# Patient Record
Sex: FEMALE | Race: WHITE | NOT HISPANIC OR LATINO | Employment: PART TIME | URBAN - METROPOLITAN AREA
[De-identification: names, ages, dates, MRNs, and addresses within clinical notes are randomized per-mention and may not be internally consistent; named-entity substitution may affect disease eponyms.]

---

## 2024-11-05 ENCOUNTER — APPOINTMENT (OUTPATIENT)
Dept: DERMATOLOGY | Facility: CLINIC | Age: 69
End: 2024-11-05
Payer: MEDICARE

## 2024-11-05 DIAGNOSIS — Z12.83 SCREENING EXAM FOR SKIN CANCER: ICD-10-CM

## 2024-11-05 DIAGNOSIS — D22.9 NEVUS: Primary | ICD-10-CM

## 2024-11-05 DIAGNOSIS — L92.0 GRANULOMA ANNULARE: ICD-10-CM

## 2024-11-05 DIAGNOSIS — L81.4 LENTIGO: ICD-10-CM

## 2024-11-05 PROCEDURE — 1159F MED LIST DOCD IN RCRD: CPT | Performed by: NURSE PRACTITIONER

## 2024-11-05 PROCEDURE — 99213 OFFICE O/P EST LOW 20 MIN: CPT | Performed by: NURSE PRACTITIONER

## 2024-11-05 NOTE — PROGRESS NOTES
Subjective     Carla Mcgovern is a 68 y.o. female who presents for the following: Skin Check.   Established patient in for full body skin exam, patient has concerns of spot on nose present for one month.     Review of Systems:  No other skin or systemic complaints other than what is documented elsewhere in the note.    The following portions of the chart were reviewed this encounter and updated as appropriate:       Skin Cancer History  SCC- Left upper arm - 2014    Specialty Problems    None    Past Medical History:  Carla Mcgovern  has no past medical history on file.    Past Surgical History:  Carla Mcgovern  has a past surgical history that includes Other surgical history (01/25/2022).    Family History:  Patient family history is not on file.    Social History:  Carla Mcgovern  has no history on file for tobacco use, alcohol use, and drug use.    Allergies:  Patient has no allergy information on record.    Current Medications / CAM's:  No current outpatient medications on file.     Objective   Well appearing patient in no apparent distress; mood and affect are within normal limits.      Assessment/Plan   1. Nevus  Uniform pigmented macule(s)/papule(s) with reassuring findings on dermoscopy    -Discussed nature of condition  -Reassurance, benign-appearing features on examination today  -Recommend continued observation    2. Lentigo  Tan macules    -Benign appearing on exam  -Reassurance, recommend observation    3. Granuloma annulare  Left Thigh - Posterior, Right Thigh - Posterior  Erythematous dermal annular papule(s)/plaque(s) without scale    -Discussed nature of condition  -Reviewed treatment options. This condition can sometimes be quite difficult to treat.  -Patient declines treatment   -Recommend:      4. Screening exam for skin cancer

## 2025-12-02 ENCOUNTER — APPOINTMENT (OUTPATIENT)
Dept: PRIMARY CARE | Facility: CLINIC | Age: 70
End: 2025-12-02
Payer: MEDICARE